# Patient Record
Sex: FEMALE | Race: WHITE | URBAN - METROPOLITAN AREA
[De-identification: names, ages, dates, MRNs, and addresses within clinical notes are randomized per-mention and may not be internally consistent; named-entity substitution may affect disease eponyms.]

---

## 2019-05-20 ENCOUNTER — OFFICE VISIT (OUTPATIENT)
Dept: URGENT CARE | Facility: URGENT CARE | Age: 34
End: 2019-05-20
Payer: COMMERCIAL

## 2019-05-20 VITALS
TEMPERATURE: 98.3 F | SYSTOLIC BLOOD PRESSURE: 100 MMHG | DIASTOLIC BLOOD PRESSURE: 70 MMHG | OXYGEN SATURATION: 97 % | HEART RATE: 77 BPM

## 2019-05-20 DIAGNOSIS — N89.8 VAGINAL DISCHARGE: Primary | ICD-10-CM

## 2019-05-20 DIAGNOSIS — A59.9 INFECTION DUE TO TRICHOMONAS (VAGINALIS): ICD-10-CM

## 2019-05-20 LAB
SPECIMEN SOURCE: ABNORMAL
WET PREP SPEC: ABNORMAL

## 2019-05-20 PROCEDURE — 99203 OFFICE O/P NEW LOW 30 MIN: CPT | Performed by: FAMILY MEDICINE

## 2019-05-20 PROCEDURE — 87210 SMEAR WET MOUNT SALINE/INK: CPT | Performed by: FAMILY MEDICINE

## 2019-05-20 RX ORDER — METRONIDAZOLE 500 MG/1
500 TABLET ORAL 3 TIMES DAILY
Qty: 30 TABLET | Refills: 0 | Status: SHIPPED | OUTPATIENT
Start: 2019-05-20 | End: 2019-05-30

## 2019-05-21 NOTE — PATIENT INSTRUCTIONS
Patient Education     Trichomonas Vaginal Infection (Trichomoniasis)    Trichomonas vaginal infection is often called  trich.  It is caused by a parasite that is passed during sex. This makes trich a sexually transmitted disease (STD). Both men and women can get trich, but it is more common in women.  Most people who have trich don t have any symptoms at first. If symptoms do occur, they may take weeks or months to develop.  Symptoms in women can include:    Thin discharge from the vagina that may smell bad and be clear, white, gray, green, or yellow in color    Itching, burning, redness, or soreness in or around the vagina    Pain in the lower belly    Frequent urination or pain and burning during urination    Pain during sex  Symptoms in men are not very common. Men may have trich and pass it to women during sex without knowing they were ever infected.  Trich is most often treated with antibiotics. Without treatment, trich can increase the risk of more serious health problems such as:    Pelvic inflammatory disease (PID)     delivery (giving birth to a baby early if you re pregnant)    HIV and certain other sexually transmitted diseases (STDs)  Home care    Take the antibiotics you re prescribed exactly as directed. Finish all of the medicine, even if your symptoms go away.    Avoid drinking alcohol until you re done with your treatment.    Tell any partners you have sex with that you have trich. They will need be tested for trich and possibly treated as well.    Avoid having sex until you and any partners you have sex with are confirmed to no longer have trich.  Prevention  The only way to avoid getting trich or any other STD is to avoid having sex. If you choose to have sex, then take steps to lower your health risks:    Use condoms when having sex.    Limit the number of partners you have sex with.    Get tested regularly for STDs. Ask any partner you have sex with to do the same.    Don t have sex  "with anyone who has symptoms that may be due to an STD.  Follow-up care  Follow up with your healthcare provider, or as advised. Testing will likely be done to ensure that the infection has cleared.  When to seek medical advice  Call your healthcare provider right away if:    You have a fever of 100.4 F (38 C) or higher, or as directed by your provider.    Your symptoms worsen, or they don t go away even after completing your treatment.    You have new pain in the lower belly or pelvic region.    You have side effects that bother you or a reaction to the medicine you re taking.    You or any partners you have sex with have new symptoms, such as a rash, joint pain, or sores.  Date Last Reviewed: 10/1/2017    8075-6217 [employer]. [address] [place] [address], [place] [address]. All rights reserved. This information is not intended as a substitute for professional medical care. Always follow your healthcare professional's instructions.           Patient Education     Trichomonas Vaginalis (Discharge)  Does this test have other names?  Trichomonas culture, testing for \"trich\" (pronounced \"trick\"), trichomoniasis, TV  What is this test?  This test looks for the Trichomonas vaginalis (T. vaginalis) parasite. This parasite causes a sexually transmitted infection (STI) called trichomoniasis. This is a common type of STI. The parasite is more likely to infect women than men.  Experts have traditionally thought it causes few complications. But during pregnancy, it can raise a woman's risk of having her baby prematurely. Infected mothers are also more likely to have a low birth weight baby. Trichomoniasis can also raise people's risk of becoming infected with or transmitting another sexually transmitted infection (STI), such as HIV. In men, this parasite can cause inflammation of the urethra.   Why do I need this test?  You may need this test to find out whether you have T. vaginalis. Many people who are infected " have no symptoms. Only about 30% of people have symptoms.  In women, the infection can cause:    Vaginal discharge    Painful urination    Unusual vaginal odor    Vaginal redness    Discomfort during intercourse    Severe vaginal itching  In men, infection may cause:    Discharge from the penis    Itching in the penis    Discomfort with urination or ejaculation  What other tests might I have along with this test?  In women, the healthcare provider might check the acidity (pH) of vaginal discharge.   Your healthcare provider may also recommend that you be tested for other STIs, such as gonorrhea and chlamydia.   What do my test results mean?  Test results may vary depending on your age, gender, health history, the method used for the test, and other things. Your test results may not mean you have a problem. Ask your healthcare provider what your test results mean for you.   A normal test result means no Trichomonas parasites have been found, and the pH of the vagina will be 4.5 or less. Visible parasites under the microscope or parasites that grow in a culture dish mean you have a trichomoniasis infection. Also during trichomoniasis, the pH of vaginal discharge may be greater than 5.   How is this test done?  In women, this test is done with a sample of vaginal discharge. To collect the sample, your healthcare provider may place a speculum in your vagina to look at the vagina and cervix.  In men, the healthcare provider may need to swab the inside of the urethra and collect a urine sample.  What might affect my test results?  Nothing should affect your test results.  How do I get ready for this test?  You don't need to prepare for this test. Be sure your healthcare provider knows about all medicines, herbs, vitamins, and supplements you are taking. This includes medicines that don't need a prescription and any illicit drugs you may use.     2616-4602 [employer]. [address] [place] [address], [place]  [address]. All rights reserved. This information is not intended as a substitute for professional medical care. Always follow your healthcare professional's instructions.           Patient Education     Vaginal Infection: Trichomoniasis     Whether or not he has symptoms, your partner will also need to be treated for trichomoniasis.   Trichomoniasis is often called  trich.  It is caused by a parasite that is passed during sex. Men with trich often don t have any symptoms. So they don t know that they are infected. In women, it can take weeks or months before symptoms develop.  Symptoms of trichomoniasis    Foamy gray or yellow-green discharge    Foul odor    Intense vaginal itching, burning, redness, and swelling at opening of vagina    Pain during sex or urination    Bleeding after sex  Treating trichomoniasis  Trich is treated with antibiotics. Be sure that you:    Finish all of your medicine. This is true even if your symptoms go away.    Avoid alcohol until you re done with all your medicine.    Tell your partner so that he can seek treatment and be tested for other STDs.    Avoid sex until you and your partner are both done with treatment.  Why treatment matters  Untreated trich can lead to problems. These include:    Increased risk of  delivery if you are pregnant    An abnormal Pap test result    Possible increased risk of pelvic inflammatory disease (PID)   Date Last Reviewed: 3/1/2017    3476-5684 [employer]. [address] [place] [address], [place] [address]. All rights reserved. This information is not intended as a substitute for professional medical care. Always follow your healthcare professional's instructions.

## 2019-05-21 NOTE — PROGRESS NOTES
SUBJECTIVE:  Chief Complaint   Patient presents with     Urgent Care     Vaginal Problem     Possible yeast infection x4-5 days- itching, vaginal discharge     Jessica Umana is a 34 year old female  presents with abnormal vaginal discharge for 5 days. No LMP recorded..  Vaginal symptoms: discharge described as yellow and vulvar itching.     Other associated symptoms: none.  Menstrual pattern: She had been bleeding regularly.    PMH:  No history of chronic health conditions      ALLERGIES:  Patient has no known allergies.      No current outpatient medications on file prior to visit.  No current facility-administered medications on file prior to visit.     Social History     Tobacco Use     Smoking status: Never Smoker     Smokeless tobacco: Never Used   Substance Use Topics     Alcohol use: Not on file       Family History:  Non-contributory,  No associated family health conditions    ROS:  CONSTITUTIONAL:NEGATIVE for fever, chills,    EYES: NEGATIVE for vision changes or irritation  ENT/MOUTH: NEGATIVE for ear, mouth and throat problems  RESP:NEGATIVE for significant cough or SOB    OBJECTIVE:  /70 (BP Location: Right arm, Patient Position: Chair, Cuff Size: Adult Large)   Pulse 77   Temp 98.3  F (36.8  C) (Oral)   SpO2 97%        :  deferred     EYES: EOMI,   conjunctiva clear  HENT: External ears with no swelling or lesions   Nose and lips without  Swelling, ulcers, erythema or lesions  NECK: normal pain free ROM  RESP: no labored respirations, no tachypnea  EXTREMITIES:   Full ROM without expression of pain or limitation x 4 extremities  NEURO: Normal strength and tone, ambulation without difficulty,   normal speech and mentation  SKIN: no suspicious lesions or rashes  PSYCH: mentation and affect appears normal and patient appearance--appropriately groomed       Results for orders placed or performed in visit on 05/20/19   Wet prep   Result Value Ref Range    Specimen Description Vagina     Wet Prep  Trichomonas seen (A)     Wet Prep No clue cells seen     Wet Prep No yeast seen     Wet Prep Moderate  WBC'S seen            ASSESSMENT:   Vaginal discharge     - Wet prep    Infection due to trichomonas (vaginalis)     - metroNIDAZOLE (FLAGYL) 500 MG tablet; Take 1 tablet (500 mg) by mouth 3 times daily for 10 days     The patient will observe these symptoms,   Return or follow-up with primary care for worsening or unexpected persistence.    Discussed that sexual partner could have similar infection under the foreskin-  Could cause re-infection

## 2020-03-11 ENCOUNTER — HEALTH MAINTENANCE LETTER (OUTPATIENT)
Age: 35
End: 2020-03-11

## 2021-01-03 ENCOUNTER — HEALTH MAINTENANCE LETTER (OUTPATIENT)
Age: 36
End: 2021-01-03

## 2021-04-25 ENCOUNTER — HEALTH MAINTENANCE LETTER (OUTPATIENT)
Age: 36
End: 2021-04-25

## 2021-10-10 ENCOUNTER — HEALTH MAINTENANCE LETTER (OUTPATIENT)
Age: 36
End: 2021-10-10

## 2022-05-21 ENCOUNTER — HEALTH MAINTENANCE LETTER (OUTPATIENT)
Age: 37
End: 2022-05-21

## 2022-09-18 ENCOUNTER — HEALTH MAINTENANCE LETTER (OUTPATIENT)
Age: 37
End: 2022-09-18

## 2023-06-04 ENCOUNTER — HEALTH MAINTENANCE LETTER (OUTPATIENT)
Age: 38
End: 2023-06-04